# Patient Record
Sex: MALE | Race: OTHER | Employment: UNEMPLOYED | URBAN - METROPOLITAN AREA
[De-identification: names, ages, dates, MRNs, and addresses within clinical notes are randomized per-mention and may not be internally consistent; named-entity substitution may affect disease eponyms.]

---

## 2022-06-18 ENCOUNTER — APPOINTMENT (OUTPATIENT)
Dept: GENERAL RADIOLOGY | Age: 27
End: 2022-06-18

## 2022-06-18 ENCOUNTER — HOSPITAL ENCOUNTER (EMERGENCY)
Age: 27
Discharge: HOME | End: 2022-06-18
Attending: STUDENT IN AN ORGANIZED HEALTH CARE EDUCATION/TRAINING PROGRAM

## 2022-06-18 VITALS
SYSTOLIC BLOOD PRESSURE: 98 MMHG | DIASTOLIC BLOOD PRESSURE: 66 MMHG | RESPIRATION RATE: 18 BRPM | OXYGEN SATURATION: 98 % | TEMPERATURE: 97.9 F | HEART RATE: 63 BPM

## 2022-06-18 DIAGNOSIS — R10.13 EPIGASTRIC PAIN: ICD-10-CM

## 2022-06-18 DIAGNOSIS — R10.10 PAIN OF UPPER ABDOMEN: Primary | ICD-10-CM

## 2022-06-18 LAB
A/G RATIO: 2.2 (ref 1.1–2.2)
ALBUMIN SERPL-MCNC: 4.7 G/DL (ref 3.4–5)
ALP BLD-CCNC: 49 U/L (ref 40–129)
ALT SERPL-CCNC: 11 U/L (ref 10–40)
ANION GAP SERPL CALCULATED.3IONS-SCNC: 10 MMOL/L (ref 3–16)
AST SERPL-CCNC: 14 U/L (ref 15–37)
BACTERIA: ABNORMAL /HPF
BASOPHILS ABSOLUTE: 0 K/UL (ref 0–0.2)
BASOPHILS RELATIVE PERCENT: 0.4 %
BILIRUB SERPL-MCNC: 0.5 MG/DL (ref 0–1)
BILIRUBIN URINE: NEGATIVE
BLOOD, URINE: NEGATIVE
BUN BLDV-MCNC: 14 MG/DL (ref 7–20)
CALCIUM SERPL-MCNC: 9.5 MG/DL (ref 8.3–10.6)
CHLORIDE BLD-SCNC: 104 MMOL/L (ref 99–110)
CLARITY: CLEAR
CO2: 25 MMOL/L (ref 21–32)
COLOR: YELLOW
CREAT SERPL-MCNC: 0.7 MG/DL (ref 0.9–1.3)
EOSINOPHILS ABSOLUTE: 0 K/UL (ref 0–0.6)
EOSINOPHILS RELATIVE PERCENT: 1.2 %
EPITHELIAL CELLS, UA: ABNORMAL /HPF (ref 0–5)
GFR AFRICAN AMERICAN: >60
GFR NON-AFRICAN AMERICAN: >60
GLUCOSE BLD-MCNC: 80 MG/DL (ref 70–99)
GLUCOSE URINE: NEGATIVE MG/DL
HCT VFR BLD CALC: 43.4 % (ref 40.5–52.5)
HEMOGLOBIN: 14.8 G/DL (ref 13.5–17.5)
KETONES, URINE: NEGATIVE MG/DL
LEUKOCYTE ESTERASE, URINE: NEGATIVE
LIPASE: 26 U/L (ref 13–60)
LYMPHOCYTES ABSOLUTE: 1.3 K/UL (ref 1–5.1)
LYMPHOCYTES RELATIVE PERCENT: 34 %
MCH RBC QN AUTO: 28.9 PG (ref 26–34)
MCHC RBC AUTO-ENTMCNC: 34.1 G/DL (ref 31–36)
MCV RBC AUTO: 84.7 FL (ref 80–100)
MICROSCOPIC EXAMINATION: ABNORMAL
MONOCYTES ABSOLUTE: 0.5 K/UL (ref 0–1.3)
MONOCYTES RELATIVE PERCENT: 12.8 %
NEUTROPHILS ABSOLUTE: 2 K/UL (ref 1.7–7.7)
NEUTROPHILS RELATIVE PERCENT: 51.6 %
NITRITE, URINE: NEGATIVE
PDW BLD-RTO: 13.5 % (ref 12.4–15.4)
PH UA: 8 (ref 5–8)
PLATELET # BLD: 235 K/UL (ref 135–450)
PMV BLD AUTO: 7.5 FL (ref 5–10.5)
POTASSIUM REFLEX MAGNESIUM: 4.2 MMOL/L (ref 3.5–5.1)
PROTEIN UA: NEGATIVE MG/DL
RBC # BLD: 5.13 M/UL (ref 4.2–5.9)
RBC UA: ABNORMAL /HPF (ref 0–4)
SODIUM BLD-SCNC: 139 MMOL/L (ref 136–145)
SPECIFIC GRAVITY UA: 1.02 (ref 1–1.03)
TOTAL PROTEIN: 6.8 G/DL (ref 6.4–8.2)
URINE TYPE: ABNORMAL
UROBILINOGEN, URINE: 0.2 E.U./DL
WBC # BLD: 3.8 K/UL (ref 4–11)
WBC UA: ABNORMAL /HPF (ref 0–5)

## 2022-06-18 PROCEDURE — 6370000000 HC RX 637 (ALT 250 FOR IP): Performed by: STUDENT IN AN ORGANIZED HEALTH CARE EDUCATION/TRAINING PROGRAM

## 2022-06-18 PROCEDURE — 87491 CHLMYD TRACH DNA AMP PROBE: CPT

## 2022-06-18 PROCEDURE — 87591 N.GONORRHOEAE DNA AMP PROB: CPT

## 2022-06-18 PROCEDURE — 83690 ASSAY OF LIPASE: CPT

## 2022-06-18 PROCEDURE — 85025 COMPLETE CBC W/AUTO DIFF WBC: CPT

## 2022-06-18 PROCEDURE — 74018 RADEX ABDOMEN 1 VIEW: CPT

## 2022-06-18 PROCEDURE — 80053 COMPREHEN METABOLIC PANEL: CPT

## 2022-06-18 PROCEDURE — 99284 EMERGENCY DEPT VISIT MOD MDM: CPT

## 2022-06-18 PROCEDURE — 81001 URINALYSIS AUTO W/SCOPE: CPT

## 2022-06-18 RX ADMIN — ALUMINUM HYDROXIDE, MAGNESIUM HYDROXIDE, AND SIMETHICONE: 200; 200; 20 SUSPENSION ORAL at 15:08

## 2022-06-18 ASSESSMENT — ENCOUNTER SYMPTOMS
ABDOMINAL DISTENTION: 0
SINUS PAIN: 0
CHEST TIGHTNESS: 0
VOMITING: 1
ANAL BLEEDING: 0
TROUBLE SWALLOWING: 0
ABDOMINAL PAIN: 1
COUGH: 0
WHEEZING: 0
DIARRHEA: 0
SHORTNESS OF BREATH: 0
NAUSEA: 1
BLOOD IN STOOL: 0
STRIDOR: 0
CONSTIPATION: 0
SINUS PRESSURE: 0

## 2022-06-18 ASSESSMENT — PAIN SCALES - GENERAL: PAINLEVEL_OUTOF10: 8

## 2022-06-18 ASSESSMENT — PAIN DESCRIPTION - LOCATION: LOCATION: ABDOMEN

## 2022-06-18 ASSESSMENT — PAIN - FUNCTIONAL ASSESSMENT: PAIN_FUNCTIONAL_ASSESSMENT: 0-10

## 2022-06-18 ASSESSMENT — PAIN DESCRIPTION - ORIENTATION: ORIENTATION: MID;UPPER

## 2022-06-18 NOTE — ED PROVIDER NOTES
ED Attending Attestation Note     Date of evaluation: 6/18/2022    This patient was seen by the resident. I have seen and examined the patient, agree with the workup, evaluation, management and diagnosis. The care plan has been discussed. My assessment reveals 48-year-old male presenting with chief complaint of diffuse abdominal cramping. Complains of a \"hard sensation\". Patient also voices concern for potential STI, states his girlfriend was recently on fluconazole. Discussed she was also on another medication however he cannot recall this was. I discussed the fluconazole treats vaginitis and yeast infections and not a sexually transmitted disease but will test patient's urine at this time. Not want any prophylactic medications. Discussion translated in Mohawk via virtual .      Juan Jose Kaufman MD  06/18/22 [id number]

## 2022-06-18 NOTE — ED PROVIDER NOTES
[facility] RESIDENT NOTE       Date of evaluation: 6/18/2022    Chief Complaint     Abdominal Pain (for a couple days patient's stomach has been \"hard\" and +n/v, -fever)      History of Present Illness     Isaías Donohue is a 31 y.o. male with no significant past medical history who presents with a CC of abdominal pain. He reports that his stomach pain has been ongoing for the last three weeks and has had occassional nausea and vomiting although he reports adequate PO intake. He denies fever, but reports his stomach has been hard, although he appears to use that to describe the clenching sensation of the pain, currently rated 8/10. The pain is in his mid-upper abdomen and does not radiate. He says it is not associated with eating or having a BM. He reports daily non-bloody bowel movements. He reports his girlfriend has had similar symptoms and he says she was prescribed fluconazole. He does not know if she has a specific diagnosis. He reports never feeling stomach pain like this before. He does not have a history of constipation or diarrhea. Of note he moved to the [place] roughly 8 months ago from [facility]. He does not have a GI doctor or a PCP. Patient is from [facility] and speaks adequate [place], he refuses an  at this time. Patient is concerned he may have a sexually transmitted infection as his girlfriend reports similar symptoms. Review of Systems     Review of Systems   Constitutional: Positive for unexpected weight change (10 pounds over the last three months due to decreased PO intake). Negative for chills and fever. HENT: Negative for congestion, sinus pressure, sinus pain, sneezing and trouble swallowing. Eyes: Negative for visual disturbance. Respiratory: Negative for cough, chest tightness, shortness of breath, wheezing and stridor. Cardiovascular: Negative for chest pain, palpitations and leg swelling. Gastrointestinal: Positive for abdominal pain, nausea and vomiting. Negative for abdominal distention, anal bleeding, blood in stool, constipation and diarrhea. Endocrine: Negative for polyuria. Genitourinary: Positive for dysuria. Negative for difficulty urinating. Musculoskeletal: Negative for arthralgias, myalgias and neck pain. Neurological: Negative for dizziness, tremors, syncope, light-headedness and headaches. Psychiatric/Behavioral: Negative for agitation and behavioral problems. Past Medical, Surgical, Family, and Social History     He has no past medical history on file. He has no past surgical history on file. His family history is not on file. He     Medications     Previous Medications    No medications on file       Allergies     He has no allergies on file. Physical Exam     INITIAL VITALS:  ,  ,  ,  ,     Physical Exam  Constitutional:       General: He is not in acute distress. Appearance: He is normal weight. HENT:      Head: Normocephalic. Mouth/Throat:      Mouth: Mucous membranes are moist.      Pharynx: Oropharynx is clear. Comments: Mildly enlarged symmetric bilateral tonsils  Eyes:      Extraocular Movements: Extraocular movements intact. Pupils: Pupils are equal, round, and reactive to light. Cardiovascular:      Rate and Rhythm: Normal rate and regular rhythm. Heart sounds: Normal heart sounds. No murmur heard. No gallop. Pulmonary:      Effort: Pulmonary effort is normal. No respiratory distress. Breath sounds: Normal breath sounds. No stridor. No wheezing, rhonchi or rales. Abdominal:      General: Abdomen is flat. Bowel sounds are normal. There is no distension. Palpations: Abdomen is soft. There is no shifting dullness, hepatomegaly or mass. Hernia: No hernia is present. Comments: Patient has mild tenderness upon palpation of the upper mid abdomen.    Genitourinary:     Testes:         Right: Tenderness or swelling not present. Left: Tenderness or swelling not present. Skin:     General: Skin is warm. Coloration: Skin is not cyanotic, jaundiced or pale. Findings: No erythema. Neurological:      General: No focal deficit present. Mental Status: He is alert and oriented to person, place, and time. Cranial Nerves: No cranial nerve deficit. Motor: No weakness. Psychiatric:         Mood and Affect: Mood is anxious. Behavior: Behavior normal.         Diagnostic Results       RADIOLOGY:  No orders to display       LABS:   No results found for this visit on 06/18/22. RECENT VITALS:   ,  , ,  ,       Procedures     None    ED Course     Nursing Notes, Past Medical Hx, Past Surgical Hx, Social Hx, Allergies, and FamilyHx were reviewed. The patient was giventhe following medications:  No orders of the defined types were placed in this encounter. CONSULTS:  None    MEDICAL DECISION MAKING / ASSESSMENT / PLAN     Aaron Krishna is a 31 y.o. male with no significant past medical history who presents with a CC of abdominal pain. Pain has been ongoing for the last 3 months without changes in quality or intensity. He has associated nausea and vomiting but he reports it is intermittent and not severe. Is not associated with food and he reports regular nonbloody bowel movements. His vitals are stable. He appears very anxious and that could be contributing to his abdominal discomfort. An initial work-up was normal and there were no abnormal findings on the KUB. Urinalysis was negative. There were no acute abnormalities to his renal panel. Patient's abdominal pain improved with a GI cocktail. Patient does not have any findings of an acute abdomen, and his abdominal pain appears to be chronic. His symptoms could be due to reflux or underlying GI pathology. At this time his pain is well controlled and he was deemed appropriate for discharge.     This patient was also evaluated by the attending physician. All care plans were discussed and agreed upon. Clinical Impression     Epigastric abdominal pain    Disposition     PATIENT REFERRED TO:  No follow-up provider specified.     DISCHARGE MEDICATIONS:  New Prescriptions    No medications on file       DISPOSITION    Discharge home with outpatient follow-up as needed     Yael Baeza MD  Resident  06/18/22 4821

## 2022-06-21 LAB
C. TRACHOMATIS DNA ,URINE: NEGATIVE
N. GONORRHOEAE DNA, URINE: NEGATIVE

## 2022-08-05 ENCOUNTER — HOSPITAL ENCOUNTER (EMERGENCY)
Age: 27
Discharge: HOME | End: 2022-08-06
Attending: EMERGENCY MEDICINE

## 2022-08-05 ENCOUNTER — APPOINTMENT (OUTPATIENT)
Dept: GENERAL RADIOLOGY | Age: 27
End: 2022-08-05

## 2022-08-05 DIAGNOSIS — U07.1 COVID-19: Primary | ICD-10-CM

## 2022-08-05 LAB
ANION GAP SERPL CALCULATED.3IONS-SCNC: 13 MMOL/L (ref 3–16)
BASOPHILS ABSOLUTE: 0 K/UL (ref 0–0.2)
BASOPHILS RELATIVE PERCENT: 0.6 %
BUN BLDV-MCNC: 10 MG/DL (ref 7–20)
CALCIUM SERPL-MCNC: 8.8 MG/DL (ref 8.3–10.6)
CHLORIDE BLD-SCNC: 100 MMOL/L (ref 99–110)
CO2: 25 MMOL/L (ref 21–32)
CREAT SERPL-MCNC: 0.9 MG/DL (ref 0.9–1.3)
EOSINOPHILS ABSOLUTE: 0 K/UL (ref 0–0.6)
EOSINOPHILS RELATIVE PERCENT: 0.1 %
GFR AFRICAN AMERICAN: >60
GFR NON-AFRICAN AMERICAN: >60
GLUCOSE BLD-MCNC: 96 MG/DL (ref 70–99)
HCT VFR BLD CALC: 44.1 % (ref 40.5–52.5)
HEMOGLOBIN: 14.7 G/DL (ref 13.5–17.5)
INFLUENZA A: NOT DETECTED
INFLUENZA B: NOT DETECTED
LYMPHOCYTES ABSOLUTE: 1.5 K/UL (ref 1–5.1)
LYMPHOCYTES RELATIVE PERCENT: 42 %
MCH RBC QN AUTO: 28.6 PG (ref 26–34)
MCHC RBC AUTO-ENTMCNC: 33.4 G/DL (ref 31–36)
MCV RBC AUTO: 85.7 FL (ref 80–100)
MONOCYTES ABSOLUTE: 0.9 K/UL (ref 0–1.3)
MONOCYTES RELATIVE PERCENT: 24.2 %
NEUTROPHILS ABSOLUTE: 1.2 K/UL (ref 1.7–7.7)
NEUTROPHILS RELATIVE PERCENT: 33.1 %
PDW BLD-RTO: 13.9 % (ref 12.4–15.4)
PLATELET # BLD: 147 K/UL (ref 135–450)
PMV BLD AUTO: 8.2 FL (ref 5–10.5)
POTASSIUM REFLEX MAGNESIUM: 3.8 MMOL/L (ref 3.5–5.1)
RBC # BLD: 5.15 M/UL (ref 4.2–5.9)
SARS-COV-2 RNA, RT PCR: DETECTED
SODIUM BLD-SCNC: 138 MMOL/L (ref 136–145)
WBC # BLD: 3.6 K/UL (ref 4–11)

## 2022-08-05 PROCEDURE — 2580000003 HC RX 258

## 2022-08-05 PROCEDURE — 71045 X-RAY EXAM CHEST 1 VIEW: CPT

## 2022-08-05 PROCEDURE — 36415 COLL VENOUS BLD VENIPUNCTURE: CPT

## 2022-08-05 PROCEDURE — 96374 THER/PROPH/DIAG INJ IV PUSH: CPT

## 2022-08-05 PROCEDURE — 99284 EMERGENCY DEPT VISIT MOD MDM: CPT

## 2022-08-05 PROCEDURE — 6370000000 HC RX 637 (ALT 250 FOR IP)

## 2022-08-05 PROCEDURE — 80048 BASIC METABOLIC PNL TOTAL CA: CPT

## 2022-08-05 PROCEDURE — 6360000002 HC RX W HCPCS

## 2022-08-05 PROCEDURE — 87636 SARSCOV2 & INF A&B AMP PRB: CPT

## 2022-08-05 PROCEDURE — 85025 COMPLETE CBC W/AUTO DIFF WBC: CPT

## 2022-08-05 RX ORDER — SODIUM CHLORIDE, SODIUM LACTATE, POTASSIUM CHLORIDE, CALCIUM CHLORIDE 600; 310; 30; 20 MG/100ML; MG/100ML; MG/100ML; MG/100ML
1000 INJECTION, SOLUTION INTRAVENOUS CONTINUOUS
Status: DISCONTINUED | OUTPATIENT
Start: 2022-08-05 | End: 2022-08-06 | Stop reason: HOSPADM

## 2022-08-05 RX ORDER — ONDANSETRON 2 MG/ML
4 INJECTION INTRAMUSCULAR; INTRAVENOUS ONCE
Status: COMPLETED | OUTPATIENT
Start: 2022-08-05 | End: 2022-08-05

## 2022-08-05 RX ORDER — ACETAMINOPHEN 500 MG
1000 TABLET ORAL ONCE
Status: COMPLETED | OUTPATIENT
Start: 2022-08-05 | End: 2022-08-05

## 2022-08-05 RX ADMIN — SODIUM CHLORIDE, SODIUM LACTATE, POTASSIUM CHLORIDE, AND CALCIUM CHLORIDE 1000 ML: .6; .31; .03; .02 INJECTION, SOLUTION INTRAVENOUS at 22:57

## 2022-08-05 RX ADMIN — ACETAMINOPHEN 1000 MG: 500 TABLET ORAL at 22:55

## 2022-08-05 RX ADMIN — ONDANSETRON 4 MG: 2 INJECTION INTRAMUSCULAR; INTRAVENOUS at 22:55

## 2022-08-05 ASSESSMENT — ENCOUNTER SYMPTOMS
VOMITING: 1
SHORTNESS OF BREATH: 0
SORE THROAT: 1
DIARRHEA: 0
CONSTIPATION: 0
RHINORRHEA: 0
COUGH: 1
BACK PAIN: 0
EYE PAIN: 0
EYE DISCHARGE: 0
WHEEZING: 0
EYE ITCHING: 0
NAUSEA: 1
ABDOMINAL PAIN: 0

## 2022-08-05 ASSESSMENT — PAIN - FUNCTIONAL ASSESSMENT: PAIN_FUNCTIONAL_ASSESSMENT: NONE - DENIES PAIN

## 2022-08-06 VITALS
OXYGEN SATURATION: 98 % | SYSTOLIC BLOOD PRESSURE: 113 MMHG | RESPIRATION RATE: 16 BRPM | TEMPERATURE: 98.1 F | DIASTOLIC BLOOD PRESSURE: 64 MMHG | HEART RATE: 57 BPM

## 2022-08-06 RX ORDER — ONDANSETRON 4 MG/1
4 TABLET, ORALLY DISINTEGRATING ORAL EVERY 8 HOURS PRN
Qty: 10 TABLET | Refills: 0 | Status: SHIPPED | OUTPATIENT
Start: 2022-08-06

## 2022-08-06 NOTE — DISCHARGE INSTRUCTIONS
Jerica Gave, you tested positive for COVID-19 today which would explain all of your symptoms. Otherwise, your labs are normal.  Your chest x-ray does not show any evidence of pneumonia. I want you to drink at least 100 ounces of fluids per day and take ibuprofen and Tylenol on a schedule for your pain and fevers. Please remain in isolation until 5 days from the onset of your symptoms to limit the passing of the virus onto anyone else. Please return to the emergency department if you develop difficulty breathing, passing out episodes, significant chest pain or any other concerning symptoms. I have given you a prescription for Zofran to go home with, which is for nausea.

## 2022-08-06 NOTE — ED PROVIDER NOTES
ED Attending Attestation Note     Date of evaluation: 8/5/2022    This patient was seen by the advance practice provider. The care plan has been discussed. Patient with positive COVID test without concern for significant complication. Recommend symptomatic treatment and return precautions.      Nikko Izquierdo MD  08/05/22 [id number]

## 2022-08-06 NOTE — ED PROVIDER NOTES
[facility]          PHYSICIAN ASSISTANT NOTE       Date of evaluation: 8/5/2022    Chief Complaint     Fever, Generalized Body Aches, and Abdominal Cramping (N/v/d the past two days. )      History of Present Illness     Joen Boast is a 28 y.o. male with no past medical history who presents to the emergency department with chief complaint of fatigue, malaise, fevers, chills, body aches. He states that his symptoms started 2 days ago. He states that yesterday he had some diffuse abdominal cramping but this has resolved. He also does endorse nausea and vomiting, mostly when he tries to eat or drink but denies any nausea or vomiting at the moment. He has normal bowel movements and has not had any constipation or diarrhea. He has no abdominal pain or chest pain at this point. He also denies any difficulty breathing. He does endorse congestion and a slight cough that is nonproductive. He is vaccinated for COVID. He is from [facility] and has been in the [place] for 9 months and has not traveled recently. He has no known sick contacts. He denies any flank pain, dysuria, urinary urgency, urinary frequency, rashes, lower extremity edema or any other significant symptoms. Review of Systems     Review of Systems   Constitutional:  Positive for chills, fatigue and fever. HENT:  Positive for congestion and sore throat. Negative for rhinorrhea. Eyes:  Negative for pain, discharge and itching. Respiratory:  Positive for cough. Negative for shortness of breath and wheezing. Cardiovascular:  Negative for chest pain, palpitations and leg swelling. Gastrointestinal:  Positive for nausea and vomiting. Negative for abdominal pain, constipation and diarrhea. Genitourinary:  Negative for dysuria, flank pain and hematuria. Musculoskeletal:  Positive for myalgias. Negative for back pain. Neurological:  Negative for seizures, syncope and headaches. Psychiatric/Behavioral:  Negative for agitation and behavioral problems. Past Medical, Surgical, Family, and Social History     He has no past medical history on file. He has no past surgical history on file. His family history is not on file. He reports that he has been smoking. He has been smoking an average of .5 packs per day. He has never used smokeless tobacco. He reports that he does not drink alcohol and does not use drugs. Medications     Previous Medications    No medications on file       Allergies     He has No Known Allergies. Physical Exam     INITIAL VITALS: BP: 110/70, Temp: (!) 102.2 °F (39 °C), Heart Rate: 74, Resp: 18, SpO2: 100 %  Physical Exam  Constitutional:       Appearance: He is ill-appearing and diaphoretic. He is not toxic-appearing. HENT:      Head: Normocephalic and atraumatic. Right Ear: Tympanic membrane, ear canal and external ear normal.      Left Ear: Tympanic membrane, ear canal and external ear normal.      Nose: Congestion present. Mouth/Throat:      Mouth: Mucous membranes are moist.      Pharynx: No oropharyngeal exudate. Comments: Mild diffuse erythema in the posterior pharynx. No uvular deviation, tonsillar swelling or exudates, voice normal, tolerating secretions well  Eyes:      Extraocular Movements: Extraocular movements intact. Pupils: Pupils are equal, round, and reactive to light. Cardiovascular:      Rate and Rhythm: Normal rate and regular rhythm. Pulses: Normal pulses. Heart sounds: Normal heart sounds. No murmur heard. No gallop. Pulmonary:      Effort: Pulmonary effort is normal. No respiratory distress. Breath sounds: Rhonchi present. No wheezing or rales. Chest:      Chest wall: No tenderness. Abdominal:      General: Abdomen is flat. Bowel sounds are normal. There is no distension. Palpations: Abdomen is soft. Tenderness: There is no abdominal tenderness.  There is no guarding or rebound. Musculoskeletal:      Cervical back: Normal range of motion and neck supple. Right lower leg: No edema. Left lower leg: No edema. Comments: Diffuse tenderness of palpation of the extremities   Lymphadenopathy:      Cervical: No cervical adenopathy. Skin:     Capillary Refill: Capillary refill takes less than 2 seconds. Findings: No rash. Neurological:      Mental Status: He is alert and oriented to person, place, and time. Psychiatric:         Mood and Affect: Mood normal.         Behavior: Behavior normal.       Diagnostic Results     RADIOLOGY:  XR CHEST PORTABLE   Final Result      No acute pulmonary disease.              LABS:   Results for orders placed or performed during the hospital encounter of 08/05/22   COVID-19 & Influenza Combo    Specimen: Nasopharyngeal Swab   Result Value Ref Range    SARS-CoV-2 RNA, RT PCR DETECTED (A) NOT DETECTED    INFLUENZA A NOT DETECTED NOT DETECTED    INFLUENZA B NOT DETECTED NOT DETECTED   Basic Metabolic Panel w/ Reflex to MG   Result Value Ref Range    Sodium 138 136 - 145 mmol/L    Potassium reflex Magnesium 3.8 3.5 - 5.1 mmol/L    Chloride 100 99 - 110 mmol/L    CO2 25 21 - 32 mmol/L    Anion Gap 13 3 - 16    Glucose 96 70 - 99 mg/dL    BUN 10 7 - 20 mg/dL    Creatinine 0.9 0.9 - 1.3 mg/dL    GFR Non-African American >60 >60    GFR African American >60 >60    Calcium 8.8 8.3 - 10.6 mg/dL   CBC with Auto Differential   Result Value Ref Range    WBC 3.6 (L) 4.0 - 11.0 K/uL    RBC 5.15 4.20 - 5.90 M/uL    Hemoglobin 14.7 13.5 - 17.5 g/dL    Hematocrit 44.1 40.5 - 52.5 %    MCV 85.7 80.0 - 100.0 fL    MCH 28.6 26.0 - 34.0 pg    MCHC 33.4 31.0 - 36.0 g/dL    RDW 13.9 12.4 - 15.4 %    Platelets 961 419 - 470 K/uL    MPV 8.2 5.0 - 10.5 fL    Neutrophils % 33.1 %    Lymphocytes % 42.0 %    Monocytes % 24.2 %    Eosinophils % 0.1 %    Basophils % 0.6 %    Neutrophils Absolute 1.2 (L) 1.7 - 7.7 K/uL    Lymphocytes Absolute 1.5 1.0 - 5.1 K/uL Monocytes Absolute 0.9 0.0 - 1.3 K/uL    Eosinophils Absolute 0.0 0.0 - 0.6 K/uL    Basophils Absolute 0.0 0.0 - 0.2 K/uL       ED BEDSIDE ULTRASOUND:  No results found. RECENT VITALS:  BP: 113/64, Temp: 98.1 °F (36.7 °C), Heart Rate: 57, Resp: 16, SpO2: 98 %     Procedures     none    ED Course     Nursing Notes, Past Medical Hx,Past Surgical Hx, Social Hx, Allergies, and Family Hx were reviewed. The patient was given the following medications:  Orders Placed This Encounter   Medications    lactated ringers infusion 1,000 mL    acetaminophen (TYLENOL) tablet 1,000 mg    ondansetron (ZOFRAN) injection 4 mg    ondansetron (ZOFRAN ODT) 4 MG disintegrating tablet     Sig: Take 1 tablet by mouth every 8 hours as needed for Nausea or Vomiting     Dispense:  10 tablet     Refill:  0       CONSULTS:  None    MEDICAL DECISION MAKING / ASSESSMENT / Clarice Alysia is a 28 y.o. male that presents to the emergency department with fatigue, malaise, fever, chills, body aches. The patient is vaccinated for COVID. Symptoms have been ongoing for the past 2 days. He has no other medical problems. On exam, the patient is uncomfortable appearing but otherwise vitals are normal with the exception of fever to 102 Fahrenheit. Physical exam largely unremarkable. No abdominal tenderness. Diffuse extremity tenderness. No rashes. To further evaluate this patient's symptoms I did obtain CBC, BMP, COVID-19 and influenza test.  He did test positive for COVID-19. The patient was given fluids, Toradol, Zofran. He was tolerating p.o. intake prior to discharge. The patient is otherwise healthy and does not meet criteria for Paxlovid or steroids at this point. The patient was given isolation precautions and symptomatic management such as adequate fluids orally, ibuprofen, Tylenol, rest.  He was instructed to follow-up with his PCP.   The patient was in agreement to this plan he was discharged in stable condition. This patient was also evaluated by the attending physician. All care plans were discussed and agreed upon.     Clinical Impression     1. COVID-19        Disposition     PATIENT REFERRED TO:  [facility] Emergency Department  [address] [place] [address]  Go to   As needed, If symptoms worsen    DISCHARGE MEDICATIONS:  New Prescriptions    ONDANSETRON (ZOFRAN ODT) 4 MG DISINTEGRATING TABLET    Take 1 tablet by mouth every 8 hours as needed for Nausea or Vomiting       DISPOSITION Decision To Discharge 08/05/2022 11:59:34 PM        VICKY Lopez  08/06/22 0110